# Patient Record
Sex: MALE | Race: BLACK OR AFRICAN AMERICAN | NOT HISPANIC OR LATINO | URBAN - METROPOLITAN AREA
[De-identification: names, ages, dates, MRNs, and addresses within clinical notes are randomized per-mention and may not be internally consistent; named-entity substitution may affect disease eponyms.]

---

## 2018-01-23 ENCOUNTER — EMERGENCY (EMERGENCY)
Facility: HOSPITAL | Age: 28
LOS: 1 days | Discharge: HOME | End: 2018-01-23
Attending: EMERGENCY MEDICINE | Admitting: EMERGENCY MEDICINE
Payer: COMMERCIAL

## 2018-01-23 VITALS
HEART RATE: 63 BPM | DIASTOLIC BLOOD PRESSURE: 74 MMHG | TEMPERATURE: 98 F | SYSTOLIC BLOOD PRESSURE: 174 MMHG | OXYGEN SATURATION: 100 % | RESPIRATION RATE: 16 BRPM

## 2018-01-23 VITALS
DIASTOLIC BLOOD PRESSURE: 72 MMHG | HEART RATE: 61 BPM | SYSTOLIC BLOOD PRESSURE: 128 MMHG | RESPIRATION RATE: 15 BRPM | OXYGEN SATURATION: 98 %

## 2018-01-23 DIAGNOSIS — R07.89 OTHER CHEST PAIN: ICD-10-CM

## 2018-01-23 PROCEDURE — 71046 X-RAY EXAM CHEST 2 VIEWS: CPT | Mod: 26

## 2018-01-23 PROCEDURE — 93010 ELECTROCARDIOGRAM REPORT: CPT

## 2018-01-23 PROCEDURE — 99284 EMERGENCY DEPT VISIT MOD MDM: CPT

## 2018-01-23 PROCEDURE — 99284 EMERGENCY DEPT VISIT MOD MDM: CPT | Mod: 25

## 2018-01-23 NOTE — ED PROVIDER NOTE - OBJECTIVE STATEMENT
28 y/o male with no PMHx presents to ED c/o CP. Patient was sent over from [facility] after tests revealed abnormal EKG, and has been experiencing on-and-off CP for the past 2 weeks. Pain is left-sided, sharp, without radiation, and has no exertional or positional component. Denies cough, SOB, dizziness, or lightheadedness. No pertinent FHx present. He denies smoking and any change in exercise     tolerance.

## 2018-01-23 NOTE — ED PROVIDER NOTE - DIAGNOSTIC INTERPRETATION
ER Physician: Hill Madden  CHEST XRAY INTERPRETATION: lungs clear, heart shadow normal, bony structures intact

## 2018-01-23 NOTE — ED PROVIDER NOTE - PROGRESS NOTE DETAILS
CXR clear, EKG nsr.  Will refer to Dr. Cardona.  Conservative management discussed with the patient in detail.  Close PMD follow up encouraged.  Strict ED return instructions discussed in detail and patient given the opportunity to ask any questions about their discharge diagnosis and instructions

## 2018-01-23 NOTE — CONSULT NOTE ADULT - SUBJECTIVE AND OBJECTIVE BOX
[facility] Consultation    CHIEF COMPLAINT: CP    HISTORY OF PRESENT ILLNESS: 28 y/o male with several weeks of chest discomfort. The pain was left sided and sharp. Symptoms noted with activity and at rest. He had associated dyspnea on occasion. [facility] was [facility] and was sent over with abnormal ekg.     PAST MEDICAL & SURGICAL HISTORY:  No pertinent past medical history  No significant past surgical history    Allergies No Known Allergies    MEDICATIONS: none    FAMILY HISTORY: no CAD    SOCIAL HISTORY:  no EtOH, tobacco or drug use    REVIEW OF SYSTEMS:  CONSTITUTIONAL: No fever, weight loss, or fatigue  EYES: No eye pain, visual disturbances, or discharge  ENMT:  No difficulty hearing, tinnitus, vertigo; No sinus or throat pain  NECK: No pain or stiffness  BREASTS: No pain, masses, or nipple discharge  RESPIRATORY: No cough, wheezing, chills or hemoptysis; No Shortness of Breath  CARDIOVASCULAR: No chest pain, palpitations, dizziness, or leg swelling  GASTROINTESTINAL: No abdominal or epigastric pain. No nausea, vomiting, or hematemesis; No diarrhea or constipation. No melena or hematochezia.  GENITOURINARY: No dysuria, frequency, hematuria, or incontinence  NEUROLOGICAL: No headaches, memory loss, loss of strength, numbness, or tremors  SKIN: No itching, burning, rashes, or lesions   LYMPH Nodes: No enlarged glands  ENDOCRINE: No heat or cold intolerance; No hair loss  MUSCULOSKELETAL: No joint pain or swelling; No muscle, back, or extremity pain  PSYCHIATRIC: No depression, anxiety, mood swings, or difficulty sleeping  HEME/LYMPH: No easy bruising, or bleeding gums  ALLERY AND IMMUNOLOGIC: No hives or eczema	      PHYSICAL EXAM:  T(C): 36.7 (01-23-18 @ 10:01), Max: 36.7 (01-23-18 @ 10:01)  HR: 61 (01-23-18 @ 10:57) (61 - 63)  BP: 128/72 (01-23-18 @ 10:57) (128/72 - 174/74)  RR: 15 (01-23-18 @ 10:57) (15 - 16)  SpO2: 98% (01-23-18 @ 10:57) (98% - 100%)  Appearance: AA man NAD  HEENT:   Normal oral mucosa, PERRL, EOMI	  Lymphatic: No lymphadenopathy  Cardiovascular: Normal S1 S2, No JVD, No murmurs, No edema  Respiratory: Lungs clear to auscultation	  Psychiatry: A & O x 3, Mood & affect appropriate  Gastrointestinal:  Soft, Non-tender, + BS	  Skin: No rashes, No ecchymoses, No cyanosis	  Neurologic: Non-focal  Extremities: Normal range of motion, No clubbing, cyanosis or edema  Vascular: Peripheral pulses palpable 2+ bilaterally  	    ECG:  	SR non-specific st-t abnormalities    LABS:	 	noted  ASSESSMENT/PLAN: 	28 y/o male with atypical chest pain, borderline HTN, abnormal ekg  1. patient seen and examined, chart reviewed  2. NSAIDs and o/p follow up advised  3. happy to see in the office for a stress echo    I spent 45 min in care of this patient

## 2018-01-23 NOTE — ED PROVIDER NOTE - MEDICAL DECISION MAKING DETAILS
Atypical CP in an otherwise healthy 28 y/o male. On exam, no loud murmurs, lungs clear. No pertinent FHx present. Will read EKG and CXR.

## 2018-01-23 NOTE — ED ADULT NURSE NOTE - OBJECTIVE STATEMENT
non-reproduceable pain to the left chest times two weeks. denies any shortness of breath or injury. full ROM, unable to identify exacerbating or mitigating factors at this time. resting in bed, no signs of distress, updated on plan of care.

## 2022-02-17 NOTE — ED PROVIDER NOTE - SCRIBE NAME
From: Jacy Sterling  To: Mee Stephenson  Sent: 2/15/2022 2:50 PM CST  Subject: Rheumatoid arthritis     Faustino Stephenson. I went to urgent care this past Saturday for very swollen, painful fingers, hand, wrist. The DrEnoc suspected gout or rheumatoid arthritis. Gout was negative. I was advised to follow up with you in 2 weeks, and to see a Rheumatologist asa. I have an appointment with you in early March, by can’t get an appointment with the Rheumatologist until May. Do you think I should have the blood test done there at [place] in the near future so I know how to proceed based on the results? I took prednisone for 3 days and it helped tremendously, but it’s gone. Now taking Tylenol arthritis and Aleve. Thanks    Edwina Layne

## 2023-04-15 NOTE — ED PROVIDER NOTE - DR. NAME
Team - please call patient with results.  Uric acid level is elevation, consistent with gout.    Should follow up with primary care provider in the next 1 week to discuss.  Should be improving on the prednisone.   [place]